# Patient Record
Sex: FEMALE | Race: WHITE | NOT HISPANIC OR LATINO | Employment: OTHER | URBAN - METROPOLITAN AREA
[De-identification: names, ages, dates, MRNs, and addresses within clinical notes are randomized per-mention and may not be internally consistent; named-entity substitution may affect disease eponyms.]

---

## 2017-03-28 ENCOUNTER — ALLSCRIPTS OFFICE VISIT (OUTPATIENT)
Dept: OTHER | Facility: OTHER | Age: 72
End: 2017-03-28

## 2017-04-11 ENCOUNTER — GENERIC CONVERSION - ENCOUNTER (OUTPATIENT)
Dept: OTHER | Facility: OTHER | Age: 72
End: 2017-04-11

## 2017-09-11 DIAGNOSIS — M85.80 OTHER SPECIFIED DISORDERS OF BONE DENSITY AND STRUCTURE, UNSPECIFIED SITE: ICD-10-CM

## 2017-09-11 DIAGNOSIS — C50.911 MALIGNANT NEOPLASM OF RIGHT FEMALE BREAST (HCC): ICD-10-CM

## 2017-09-29 ENCOUNTER — ALLSCRIPTS OFFICE VISIT (OUTPATIENT)
Dept: OTHER | Facility: OTHER | Age: 72
End: 2017-09-29

## 2017-10-02 NOTE — PROGRESS NOTES
Assessment  1  Carcinoma of right breast, estrogen receptor positive (174 9,V86 0) (C50 911,Z17 0)   2  Osteopenia (733 90) (M85 80)    Plan  Carcinoma of right breast, estrogen receptor positive, Osteopenia    · (1) CANCER ANTIGEN 15-3; Status:Active; Requested WRN:[id number]; Perform:[facility]; Due:03Mye8950;Ordered; For:Carcinoma of right breast, estrogen receptor positive, Osteopenia; Ordered By:Franchesca Packer;   · (1) CBC/PLT/DIFF; Status:Active; Requested UEC:[id number]; Perform:[facility]; Due:64Znq7066;Ordered; For:Carcinoma of right breast, estrogen receptor positive, Osteopenia; Ordered By:Franchesca Packer;   · (1) COMPREHENSIVE METABOLIC PANEL; Status:Active; Requested OKY:[id number]; Perform:[facility]; Due:56Gwl8795;Ordered; For:Carcinoma of right breast, estrogen receptor positive, Osteopenia; Ordered By:Franchesca Packer;   · MAMMO DIAGNOSTIC BILATERAL W 3D & CAD; Status:Active; Requested for:77Sxf2112; Perform:[facility]; Due:92Aup1036;Ordered; For:Carcinoma of right breast, estrogen receptor positive, Osteopenia; Ordered By:Franchesca Packer;   · Follow-up visit in 1 year Evaluation and Treatment  Follow-up  Status: Complete  Done:  60QPC6845 11:40AM   Ordered; For: Carcinoma of right breast, estrogen receptor positive, Osteopenia; Ordered By: Thelma Styles Performed:  Due: 53TLH9063; Last Updated By: Mellissa Roldan; 9/29/2017 12:55:39 PM    Discussion/Summary  Discussion Summary:   Joby Hanley remains in clinical remission of invasive ductal carcinoma the right breast, hormone receptor positive, T1cN0, diagnosed in March 2011  A little over 5 years of adjuvant anastrozole was completed in June 2016  Again, the patient is not in favor of extended adjuvant aromatase inhibitor therapy in view of osteopenia   She is scheduled for bilateral diagnostic mammogram in April 2018   600 mg with vitamin D daily is to be continued in regards to a borderline/mild osteopenia of lumbar spine and femoral neck  A follow-up DEXA scan is to be obtained within the next 1-2 years  patient and her  who was with her in the office today are aware to seek medical attention for excessive fatigue or if other new problems arise  Otherwise, I plan to see her again in 1 year  Chief Complaint  Chief Complaint Free Text Note Form: Jodi Price was seen in followup today in regards to invasive ductal carcinoma the right breast, hormone receptor positive, T1cN0, diagnosed in March 2011  History of Present Illness  HPI: She has been feeling well  She has been taking calcium 6 and milligrams of vitamin D daily  She is scheduled to follow-up with Dr Janie Willis in December 2017 including hand-held breast ultrasound  Previous Therapy:   1  Invasive ductal carcinoma of the right breast [place] grade III, [place] 70%, [place] 95%, HER-2/susanna negative by FISH (1 4), no metastasis to one sentinel lymph node, re-excision with negative posterior margin 2 mm, 1 05 cm, T1cN0 Oncotype Dx recurrence score 12 (low risk) and Arimidex from April 2011 until June 2016  Multiple colonic polyps on initial colonoscopy in October 2012, a few tiny polyps were removed in the fall of 2014, and a tubular adenoma was snared from the transverse colon in December 2016  Review of Systems  Complete-Female:   Constitutional: She has been feeling well  ENT: no hearing loss  Cardiovascular: no chest pain-and-no lower extremity edema  Respiratory: no shortness of breath-and-no cough  Gastrointestinal: no abdominal pain,-no constipation-and-no diarrhea  Musculoskeletal: There is mild chronic arthritis involving the hands, no back pain  Neurological: no headache-and-no difficulty walking  Psychiatric: no emotional problems  Active Problems  1  Carcinoma of right breast, estrogen receptor positive (174 9,V86 0) (C50 911,Z17 0)   2  History of colonic polyps (V12 72) (Z86 010)   3  Osteopenia (733 90) (M85 80)    Surgical History  1  History of Breast Surgery Lumpectomy   2  History of Hysterectomy    Family History  Mother    1  Family history of Breast cancer (174 9) (C50 919)  Aunt    2  Family history of Breast cancer (174 9) (C50 919)    Social History   · Never a smoker   · Remote social alcohol use    Current Meds   1  No Reported Medications Recorded    Allergies  1  Amoxicillin TABS    Vitals  Vital Signs    Recorded: 44HLE5778 12:17PM   Temperature 97 6 F   Heart Rate 104   Respiration 16   Systolic 837   Diastolic 70   Height 5 ft 3 in   Weight 166 lb    BMI Calculated 29 41   BSA Calculated 1 79   O2 Saturation 96   Pain Scale 0     Physical Exam    Constitutional She appears well  Eyes EOMI  Ears, Nose, Mouth, and Throat Oropharynx is clear appear  Pulmonary   Auscultation of lungs: Clear to auscultation  Cardiovascular Heart has regular rate and rhythm -No lower extremity edema  Abdomen   Abdomen: Non-tender, no masses  Liver and spleen: No hepatomegaly or splenomegaly  Lymphatic   Palpation of lymph nodes in neck: No lymphadenopathy  -No axillary or inguinal lymphadenopathy  Musculoskeletal   Gait and station: Normal     Neurologic Neurological is grossly intact  Psychiatric   Orientation to person, place, and time: Normal     Mood and affect: Normal     Additional Exam:  No breast masses bilaterally  ECOG 0       Results/Data  Results Form:   Results   Radiology Bilateral mammogram from January 19, 2016: There are scattered areas of fibroglandular density, no dominant masses or clusters of suspicious microcalcifications of the left breast, there are calcifications at the site of surgery of the right breast without clearly amorphic features, six-month precautionary mammographic follow-up is suggested to ensure stability  breast diagnostic mammogram from August 2, 2016: There is benign calcification with fat necrosis, no evidence of recurrent malignancy  diagnostic mammogram from April 11, 2017: Breast density is almost entirely fat, there are stable benign dystrophic calcifications at the right breast lumpectomy site, no evidence of breast recurrence or new breast lesion  Lab Results From March 21, 2017: CA 153 is 15  September 21, 2017: CA 15-3 is 17, WBC 8 8, hemoglobin 13 5, platelets 460, creatinine 0 75, alk-phos 67, bili 0 4, AST 9, ALT 22  Future Appointments    Date/Time Provider Specialty Site   10/05/2018 11:40 AM KIMBERLY Garcia   Hematology Oncology [facility]     Signatures   Electronically signed by : KIMBERLY Chávez ; Oct  1 2017  2:51PM EST                       (Author)

## 2018-01-13 NOTE — MISCELLANEOUS
Message   Recorded as Task   Date: 06/14/2016 01:29 PM, Created By: Rick Jimenez   Task Name: Follow Up   Assigned To: Anderson Patterson   Regarding Patient: Brenda Chapman, Status: Active   CommentEsefamilia Mcleod - 14 Jun 2016 1:29 PM     TASK CREATED  pt called to office [phone] regarding her arimidex  Dr Dixie Brar  stated she should call when she reaches the 5 year rodney  Anderson Patterson - 14 Jun 2016 2:13 PM     TASK EDITED  Pt was instructed to finish her arimidex prescription and then she is done with her hormonal therapy (pt has been taking arimidex for the past 5 years)  Pt was reminded of her f/u appointment with Dr Onofre Rushing on 9/30/16 at 1:30pm         Active Problems    1  Carcinoma of right breast, estrogen receptor positive (174 9,V86 0) (C50 911,Z17 0)   2  History of colonic polyps (V12 72) (Z86 010)   3  Osteopenia (733 90) (M85 80)    Current Meds   1  Anastrozole 1 MG Oral Tablet; Take 1 tablet daily; Therapy: 39Ssf0996 to (Evaluate:14Mar2016)  Requested for: 58Haf1349; Last   Rx:[id number] Ordered   2  Caltrate 600 Plus-Vit D 600-200 MG-UNIT TABS Recorded    Allergies    1  Amoxicillin TABS    Signatures   Electronically signed by :  Sarah Bell RN; Jun 14 2016  2:13PM EST                       (Author)

## 2018-01-14 VITALS
BODY MASS INDEX: 29.59 KG/M2 | RESPIRATION RATE: 16 BRPM | OXYGEN SATURATION: 98 % | HEART RATE: 74 BPM | TEMPERATURE: 97.6 F | DIASTOLIC BLOOD PRESSURE: 64 MMHG | SYSTOLIC BLOOD PRESSURE: 122 MMHG | HEIGHT: 63 IN | WEIGHT: 167 LBS

## 2018-01-15 VITALS
WEIGHT: 166 LBS | TEMPERATURE: 97.6 F | HEART RATE: 104 BPM | HEIGHT: 63 IN | DIASTOLIC BLOOD PRESSURE: 70 MMHG | RESPIRATION RATE: 16 BRPM | SYSTOLIC BLOOD PRESSURE: 128 MMHG | BODY MASS INDEX: 29.41 KG/M2 | OXYGEN SATURATION: 96 %

## 2018-01-18 NOTE — MISCELLANEOUS
Message  called patient per Dr Fuentes Manual of right breast on 8/2/16 showed no evidence of breast cancer  patient verbally understands  Active Problems    1  Carcinoma of right breast, estrogen receptor positive (174 9,V86 0) (C50 911,Z17 0)   2  History of colonic polyps (V12 72) (Z86 010)   3  Osteopenia (733 90) (M85 80)    Current Meds   1  Anastrozole 1 MG Oral Tablet; Take 1 tablet daily; Therapy: 02Sep2014 to (Evaluate:14Mar2016)  Requested for: 64Fud1017; Last   Rx:[id number] Ordered   2  Caltrate 600 Plus-Vit D 600-200 MG-UNIT TABS Recorded    Allergies    1   Amoxicillin TABS    Signatures   Electronically signed by : Latasha Jordan, ; Aug 22 2016  2:36PM EST                       (Author)

## 2018-04-11 DIAGNOSIS — C50.911 MALIGNANT NEOPLASM OF RIGHT FEMALE BREAST (HCC): ICD-10-CM

## 2018-04-11 DIAGNOSIS — M85.80 OTHER SPECIFIED DISORDERS OF BONE DENSITY AND STRUCTURE, UNSPECIFIED SITE: ICD-10-CM

## 2018-09-17 DIAGNOSIS — M85.80 OTHER SPECIFIED DISORDERS OF BONE DENSITY AND STRUCTURE, UNSPECIFIED SITE: ICD-10-CM

## 2018-09-17 DIAGNOSIS — C50.911 MALIGNANT NEOPLASM OF RIGHT FEMALE BREAST (HCC): ICD-10-CM

## 2018-10-02 ENCOUNTER — TELEPHONE (OUTPATIENT)
Dept: HEMATOLOGY ONCOLOGY | Facility: CLINIC | Age: 73
End: 2018-10-02

## 2018-10-02 DIAGNOSIS — C50.911 MALIGNANT NEOPLASM OF RIGHT FEMALE BREAST, UNSPECIFIED ESTROGEN RECEPTOR STATUS, UNSPECIFIED SITE OF BREAST (HCC): Primary | ICD-10-CM

## 2018-10-02 NOTE — TELEPHONE ENCOUNTER
Pt bloodwork script is  lab wouldn't draw her bw can we update it in [facility] she has appt this Friday?

## 2018-10-03 ENCOUNTER — TRANSCRIBE ORDERS (OUTPATIENT)
Dept: ADMINISTRATIVE | Facility: HOSPITAL | Age: 73
End: 2018-10-03

## 2018-10-03 ENCOUNTER — APPOINTMENT (OUTPATIENT)
Dept: LAB | Facility: MEDICAL CENTER | Age: 73
End: 2018-10-03
Payer: MEDICARE

## 2018-10-03 ENCOUNTER — APPOINTMENT (OUTPATIENT)
Dept: LAB | Age: 73
End: 2018-10-03
Payer: MEDICARE

## 2018-10-03 DIAGNOSIS — C50.911 MALIGNANT NEOPLASM OF RIGHT FEMALE BREAST, UNSPECIFIED ESTROGEN RECEPTOR STATUS, UNSPECIFIED SITE OF BREAST (HCC): ICD-10-CM

## 2018-10-03 DIAGNOSIS — C50.911 MALIGNANT NEOPLASM OF RIGHT FEMALE BREAST, UNSPECIFIED ESTROGEN RECEPTOR STATUS, UNSPECIFIED SITE OF BREAST (HCC): Primary | ICD-10-CM

## 2018-10-03 LAB
ALBUMIN SERPL BCP-MCNC: 3.7 G/DL (ref 3.5–5)
ALP SERPL-CCNC: 62 U/L (ref 46–116)
ALT SERPL W P-5'-P-CCNC: 23 U/L (ref 12–78)
ANION GAP SERPL CALCULATED.3IONS-SCNC: 4 MMOL/L (ref 4–13)
AST SERPL W P-5'-P-CCNC: 9 U/L (ref 5–45)
BASOPHILS # BLD AUTO: 0.08 THOUSANDS/ΜL (ref 0–0.1)
BASOPHILS NFR BLD AUTO: 1 % (ref 0–1)
BILIRUB SERPL-MCNC: 0.38 MG/DL (ref 0.2–1)
BUN SERPL-MCNC: 18 MG/DL (ref 5–25)
CALCIUM SERPL-MCNC: 9.9 MG/DL (ref 8.3–10.1)
CHLORIDE SERPL-SCNC: 106 MMOL/L (ref 100–108)
CO2 SERPL-SCNC: 29 MMOL/L (ref 21–32)
CREAT SERPL-MCNC: 0.82 MG/DL (ref 0.6–1.3)
EOSINOPHIL # BLD AUTO: 0.01 THOUSAND/ΜL (ref 0–0.61)
EOSINOPHIL NFR BLD AUTO: 0 % (ref 0–6)
ERYTHROCYTE [DISTWIDTH] IN BLOOD BY AUTOMATED COUNT: 13.8 % (ref 11.6–15.1)
GFR SERPL CREATININE-BSD FRML MDRD: 72 ML/MIN/1.73SQ M
GLUCOSE SERPL-MCNC: 86 MG/DL (ref 65–140)
HCT VFR BLD AUTO: 43.1 % (ref 34.8–46.1)
HGB BLD-MCNC: 13.7 G/DL (ref 11.5–15.4)
IMM GRANULOCYTES # BLD AUTO: 0.06 THOUSAND/UL (ref 0–0.2)
IMM GRANULOCYTES NFR BLD AUTO: 1 % (ref 0–2)
LYMPHOCYTES # BLD AUTO: 3.42 THOUSANDS/ΜL (ref 0.6–4.47)
LYMPHOCYTES NFR BLD AUTO: 29 % (ref 14–44)
MCH RBC QN AUTO: 29.1 PG (ref 26.8–34.3)
MCHC RBC AUTO-ENTMCNC: 31.8 G/DL (ref 31.4–37.4)
MCV RBC AUTO: 92 FL (ref 82–98)
MONOCYTES # BLD AUTO: 0.89 THOUSAND/ΜL (ref 0.17–1.22)
MONOCYTES NFR BLD AUTO: 8 % (ref 4–12)
NEUTROPHILS # BLD AUTO: 7.3 THOUSANDS/ΜL (ref 1.85–7.62)
NEUTS SEG NFR BLD AUTO: 61 % (ref 43–75)
NRBC BLD AUTO-RTO: 0 /100 WBCS
PLATELET # BLD AUTO: 292 THOUSANDS/UL (ref 149–390)
PMV BLD AUTO: 12.5 FL (ref 8.9–12.7)
POTASSIUM SERPL-SCNC: 4.7 MMOL/L (ref 3.5–5.3)
PROT SERPL-MCNC: 7.9 G/DL (ref 6.4–8.2)
RBC # BLD AUTO: 4.7 MILLION/UL (ref 3.81–5.12)
SODIUM SERPL-SCNC: 139 MMOL/L (ref 136–145)
WBC # BLD AUTO: 11.76 THOUSAND/UL (ref 4.31–10.16)

## 2018-10-03 PROCEDURE — 36415 COLL VENOUS BLD VENIPUNCTURE: CPT

## 2018-10-03 PROCEDURE — 86300 IMMUNOASSAY TUMOR CA 15-3: CPT

## 2018-10-03 PROCEDURE — 85025 COMPLETE CBC W/AUTO DIFF WBC: CPT

## 2018-10-03 PROCEDURE — 80053 COMPREHEN METABOLIC PANEL: CPT

## 2018-10-04 LAB — CANCER AG15-3 SERPL-ACNC: 7.2 U/ML (ref 0–25)

## 2018-10-05 ENCOUNTER — OFFICE VISIT (OUTPATIENT)
Dept: HEMATOLOGY ONCOLOGY | Facility: CLINIC | Age: 73
End: 2018-10-05
Payer: MEDICARE

## 2018-10-05 VITALS
HEART RATE: 66 BPM | WEIGHT: 168 LBS | RESPIRATION RATE: 16 BRPM | SYSTOLIC BLOOD PRESSURE: 122 MMHG | OXYGEN SATURATION: 96 % | DIASTOLIC BLOOD PRESSURE: 74 MMHG | TEMPERATURE: 97.6 F | BODY MASS INDEX: 29.77 KG/M2 | HEIGHT: 63 IN

## 2018-10-05 DIAGNOSIS — Z17.0 BREAST CANCER, STAGE 1, ESTROGEN RECEPTOR POSITIVE, RIGHT (HCC): Primary | ICD-10-CM

## 2018-10-05 DIAGNOSIS — M85.89 OSTEOPENIA OF MULTIPLE SITES: ICD-10-CM

## 2018-10-05 DIAGNOSIS — C50.911 BREAST CANCER, STAGE 1, ESTROGEN RECEPTOR POSITIVE, RIGHT (HCC): Primary | ICD-10-CM

## 2018-10-05 PROCEDURE — 99214 OFFICE O/P EST MOD 30 MIN: CPT | Performed by: INTERNAL MEDICINE

## 2018-10-05 NOTE — PATIENT INSTRUCTIONS
The patient and her  who was with her in the office today are aware to seek medical attention for excessive fatigue or if other new problems arise

## 2018-10-05 NOTE — PROGRESS NOTES
10/5/2018    Daniel Bhakta    She had called the arthritis of the left 1st metatarsal phalangeal joint 3 weeks ago treated with corticosteroid injection and ibuprofen 200 mg t i d  She has been feeling well otherwise  She takes calcium 600 mg with vitamin-D daily  Hematology/Oncology History:    1  Invasive ductal carcinoma of the right breast [place] grade III, ER 70%, [place] 95%, HER-2/susanna negative by FISH (1 4), no metastasis to one sentinel lymph node, re-excision with negative posterior margin 2 mm, 1 05 cm, T1cN0 Oncotype Dx recurrence score 12 (low risk) and Arimidex from April 2011 until June 2016     2  Multiple colonic polyps on initial colonoscopy in October 2012, a few tiny polyps were removed in the fall of 2014, and a tubular adenoma was snared from the transverse colon in December 2016  Review of systems:      Constitutional: She has been feeling well, she denies chills or sweats  HEENT: She denies nose bleeds  She denies oral cavity or throat soreness  Cardiovascular:  She denies chest pain, no edema  Respiratory:  She denies cough or dyspnea  GI:  Her appetite has been good  No abdominal pain  Bowel habits have been formed and regular  Musculoskeletal:  See HPI  She denies back pain or joint pain otherwise  Neurologic: She denies headache or paresthesias  She denies difficulty walking  Skin:  She denies rash  Psychiatric:  She denies emotional problems  Hematologic:  She denies easy bruising  Physical Examination:    Blood pressure 122/74, pulse 66, temperature 97 6 °F (36 4 °C), resp  rate 16, height 5' 3" (1 6 m), weight 76 2 kg (168 lb), SpO2 96 %  Body surface area is 1 8 meters squared  EOMI  No hearing deficit  Orophaynx clear  No lymphadenopathy of the neck  No axillary lymphadenopathy  (Breast examination was not done today )   Lungs are clear bilaterally  Heart has regular rate and rhythm  No hepatic or splenic enlargement   No inguinal lymphadenopathy  No lower extremity edema  No ecchymoses  Normal gait and station  Neurological is grossly intact  Oriented x3, normal mood and affect  ECOG 0-1    Laboratory:    From August 29, 2018:  Uric acid is 7 0    From October 3, 2018:  Creatinine 0 82, AST 9, ALT 23, alk-phos 62, bili 0 38, WBC 11 76, hemoglobin 13 7, platelets 162, CA 46-4 is 7 2    Bilateral 3D diagnostic mammogram from April 19, 2018:  Breast are mostly replaced with fat, no dominant mass, skin thickening or significant clustered microcalcifications  Assessment/Plan:    Theron Ram remains in clinical remission of invasive ductal carcinoma the right breast, hormone receptor positive, T1cN0, diagnosed in March 2011  A little over 5 years of adjuvant anastrozole was completed in June 2016  Again, the patient is not in favor of extended adjuvant aromatase inhibitor therapy in view of osteopenia  She is scheduled for bilateral diagnostic mammogram in April 2019     600 mg with vitamin D daily is to be continued in regards to a borderline/mild osteopenia of lumbar spine and femoral neck  A follow-up DEXA scan is to be obtained within the next year  The patient plans to follow up with her primary care physician in regards to the hyperuricemia and recent gouty arthritis as to potential preventive therapy such as allopurinol  The  patient and her  who was with her in the office today are aware to seek medical attention for recurrence of joint pain especially of the 1st MTP joints, excessive fatigue or if other new problems arise  Otherwise, I plan to see her again in 1 year

## 2018-12-12 ENCOUNTER — OFFICE VISIT (OUTPATIENT)
Dept: SURGERY | Facility: CLINIC | Age: 73
End: 2018-12-12
Payer: MEDICARE

## 2018-12-12 VITALS
WEIGHT: 169 LBS | HEART RATE: 64 BPM | DIASTOLIC BLOOD PRESSURE: 66 MMHG | BODY MASS INDEX: 29.95 KG/M2 | HEIGHT: 63 IN | RESPIRATION RATE: 16 BRPM | SYSTOLIC BLOOD PRESSURE: 140 MMHG

## 2018-12-12 DIAGNOSIS — Z12.39 BREAST CANCER SCREENING, HIGH RISK PATIENT: Primary | ICD-10-CM

## 2018-12-12 PROCEDURE — 99214 OFFICE O/P EST MOD 30 MIN: CPT | Performed by: SPECIALIST

## 2018-12-13 NOTE — PROGRESS NOTES
Wilda De Paz presents today for her yearly visit in regards to a history of right breast cancer  She underwent right partial mastectomy with sentinel node biopsy for invasive ductal carcinoma in March of 2011  Her sentinel node was negative  She underwent primary radiation therapy via mammo site  She has done extremely well since that time  Family off she denies any masses in either breast denies any nipple discharges  Most recent mammogram was negative  She does follow up with Dr Morales Current  saw him a few months ago  Physical exam:  Healthy-appearing elderly female awake alert no distress    Left breast:  No masses noted no nipple discharges  Left axilla is unremarkable  Right breast:  Transverse scar at about the 6 o'clock level below the nipple  No masses no nipple discharges  Right axilla shows a small scar with no evidence of lymphadenopathy  An ultrasound of both breasts are performed  The right breast demonstrates the biopsy site with no evidence of local recurrence  There are no evidence of masses either cystic or solid  Left breast is negative  Impression:  Continues to do well status post breast conserving therapy for a infiltrating ductal adenocarcinoma  Plan:  Continue current course  Notify the office if any problems should arise  Bring her back in 1 year

## 2019-05-08 DIAGNOSIS — Z12.39 BREAST CANCER SCREENING, HIGH RISK PATIENT: ICD-10-CM

## 2019-08-27 ENCOUNTER — TRANSCRIBE ORDERS (OUTPATIENT)
Dept: ADMINISTRATIVE | Facility: HOSPITAL | Age: 74
End: 2019-08-27

## 2019-10-01 ENCOUNTER — TELEPHONE (OUTPATIENT)
Dept: HEMATOLOGY ONCOLOGY | Facility: CLINIC | Age: 74
End: 2019-10-01

## 2019-10-01 NOTE — TELEPHONE ENCOUNTER
Patient called in as she received a message from Dr Oma Soto team in regards to time change for upcoming appointment , Transfer call to office for further assistance

## 2019-10-10 ENCOUNTER — OFFICE VISIT (OUTPATIENT)
Dept: HEMATOLOGY ONCOLOGY | Facility: CLINIC | Age: 74
End: 2019-10-10
Payer: MEDICARE

## 2019-10-10 VITALS
OXYGEN SATURATION: 98 % | DIASTOLIC BLOOD PRESSURE: 80 MMHG | WEIGHT: 168 LBS | TEMPERATURE: 97.7 F | RESPIRATION RATE: 18 BRPM | BODY MASS INDEX: 29.77 KG/M2 | HEIGHT: 63 IN | HEART RATE: 64 BPM | SYSTOLIC BLOOD PRESSURE: 136 MMHG

## 2019-10-10 DIAGNOSIS — M85.89 OSTEOPENIA OF MULTIPLE SITES: ICD-10-CM

## 2019-10-10 DIAGNOSIS — C50.911 BREAST CANCER, STAGE 1, ESTROGEN RECEPTOR POSITIVE, RIGHT (HCC): Primary | ICD-10-CM

## 2019-10-10 DIAGNOSIS — Z17.0 BREAST CANCER, STAGE 1, ESTROGEN RECEPTOR POSITIVE, RIGHT (HCC): Primary | ICD-10-CM

## 2019-10-10 PROCEDURE — 99214 OFFICE O/P EST MOD 30 MIN: CPT | Performed by: INTERNAL MEDICINE

## 2019-10-10 NOTE — PROGRESS NOTES
10/10/2019    Juan Diego Andino    She has been feeling well  She has been taking calcium 600 mg with vitamin-D daily  Hematology/Oncology History:    1  Invasive ductal carcinoma of the right breast [place] grade III, ER 70%, [place] 95%, HER-2/susanna negative by FISH (1 4), no metastasis to one sentinel lymph node, re-excision with negative posterior margin 2 mm, 1 05 cm, T1cN0 Oncotype Dx recurrence score 12 (low risk) and Arimidex from April 2011 until June 2016      2  Multiple colonic polyps on initial colonoscopy in October 2012, a few tiny polyps were removed in the fall of 2014, and a tubular adenoma was snared from the transverse colon in December 2016  Review of systems:      Constitutional: She has been feeling well, she denies chills or sweats  HEENT: She denies nose bleeds  She denies oral cavity or throat soreness  Cardiovascular:  She denies chest pain, no edema  Respiratory:  She denies cough or dyspnea  GI:  Her appetite has been good  No abdominal pain  Bowel habits have been formed and regular  Musculoskeletal:  She denies back pain or joint pain  Neurologic: She denies headache or paresthesias  She denies difficulty walking  Skin:  She denies rash  Psychiatric:  She denies emotional problems  Hematologic:  She denies easy bruising  Physical Examination:    Blood pressure 136/80, pulse 64, temperature 97 7 °F (36 5 °C), resp  rate 18, height 5' 3" (1 6 m), weight 76 2 kg (168 lb), SpO2 98 %  Body surface area is 1 8 meters squared  She appears well  EOMI  No hearing deficit  Orophaynx clear  No lymphadenopathy of the neck  No axillary lymphadenopathy  (Breast examination was not done today, she is scheduled to follow-up with her breast surgeon Dr Ana M Morton in December 2019 )   Lungs are clear bilaterally  Heart has regular rate and rhythm  No hepatic or splenic enlargement  No inguinal lymphadenopathy  No lower extremity edema  No ecchymoses  Normal gait and station  Neurological is grossly intact  Oriented x3, normal mood and affect      ECOG 0-1    Laboratory:     From October 3, 2019:  WBC is 8 8, hemoglobin 13 6, platelets 959, CA 95-2 is 15 (< 38), creatinine 0 8, calcium 9 1, alk-phos 63, bili 0 4, AST 13, ALT 20    3D bilateral screening mammogram from April 22, 2019: There are scattered areas of fibroglandular density, there is benign dystrophic calcification, 14 mm, at the right lumpectomy site with no adverse change to suggest local recurrence, benign right axillary nodes, no suspicious findings of the left breast or left axillary adenopathy  Assessment/Plan:    Jesse Bishop remains in clinical remission of invasive ductal carcinoma the right breast, hormone receptor positive, T1cN0, diagnosed in March 2011  She is scheduled for bilateral diagnostic mammogram in April 2020      600 mg with vitamin D daily is to be continued in regards to a borderline/mild osteopenia of lumbar spine and femoral neck  A follow-up DEXA scan is scheduled for October 14, 2019  The patient is to contact the office of her gastroenterologist Dr Rudolph Holliday as to whether she is due for surveillance colonoscopy this year      The  patient and her  who was with her in the office today are aware to seek medical attention for recurrence of joint pain, excessive fatigue or if other new problems arise  Otherwise, I plan to see her again in 1 year  Today's office visit required 25 minutes, over 50% of the time was utilized to review diagnostic tests, impressions and recommendations

## 2019-10-14 ENCOUNTER — TRANSCRIBE ORDERS (OUTPATIENT)
Dept: ADMINISTRATIVE | Facility: HOSPITAL | Age: 74
End: 2019-10-14

## 2019-10-14 ENCOUNTER — HOSPITAL ENCOUNTER (OUTPATIENT)
Dept: RADIOLOGY | Facility: IMAGING CENTER | Age: 74
Discharge: HOME | End: 2019-10-14
Payer: MEDICARE

## 2019-10-14 DIAGNOSIS — M85.89 OSTEOPENIA OF MULTIPLE SITES: ICD-10-CM

## 2019-10-14 PROCEDURE — 77080 DXA BONE DENSITY AXIAL: CPT

## 2019-10-22 ENCOUNTER — TELEPHONE (OUTPATIENT)
Dept: HEMATOLOGY ONCOLOGY | Facility: CLINIC | Age: 74
End: 2019-10-22

## 2019-10-22 NOTE — TELEPHONE ENCOUNTER
Called pt and left a message to call our office back  Per Dr Ashlyn Sosa pt's Dexa scan is normal bone density

## 2019-12-17 ENCOUNTER — OFFICE VISIT (OUTPATIENT)
Dept: SURGERY | Facility: CLINIC | Age: 74
End: 2019-12-17
Payer: MEDICARE

## 2019-12-17 VITALS
HEIGHT: 63 IN | HEART RATE: 80 BPM | SYSTOLIC BLOOD PRESSURE: 142 MMHG | BODY MASS INDEX: 29.77 KG/M2 | WEIGHT: 168 LBS | RESPIRATION RATE: 16 BRPM | DIASTOLIC BLOOD PRESSURE: 70 MMHG

## 2019-12-17 DIAGNOSIS — Z85.3 HISTORY OF BREAST CANCER: Primary | ICD-10-CM

## 2019-12-17 PROCEDURE — 99213 OFFICE O/P EST LOW 20 MIN: CPT | Performed by: SPECIALIST

## 2019-12-17 NOTE — PROGRESS NOTES
Hodan Adhikari and her  Heriberto Nieves presents today for Hodan Adhikari is yearly breast exam   She has a history of carcinoma of the right breast and underwent a partial mastectomy with sentinel node biopsy for invasive ductal carcinoma   This occurred March 2011  Her sentinel node was negative which was great  She underwent primary radiation therapy via mammo site insertion  She has done extremely well since then  Today in the office she denies any masses in either breast   She underwent mammogram in April of 2019 that demonstrated no evidence of malignancy or other abnormalities  She is otherwise healthy  Physical exam:  Healthy-appearing it elderly white female who is awake alert no distress    Left breast no masses or abnormalities palpated  No nipple discharges are present  The left axilla is unremarkable  Right breast:  There is a transverse incision just above the inframammary fold consistent with previous partial mastectomy  This area is soft with no evidence of local recurrence  The remainder the breast demonstrate no masses or nipple discharges  Right axilla is unremarkable  An ultrasound of the left breast performed  There is no sonographic evidence of mass, architectural distortion, etc etc   The biopsy site appears to be unremarkable  The right breast is also negative for any abnormalities  Impression:  Doing well 8 year status post breast conserving therapy on the left  Plan:  Stay the course  Notify the office if any problems or abnormalities should arise  We will make appointment to see her in 1 year

## 2020-05-22 DIAGNOSIS — C50.911 BREAST CANCER, STAGE 1, ESTROGEN RECEPTOR POSITIVE, RIGHT (HCC): ICD-10-CM

## 2020-05-22 DIAGNOSIS — Z17.0 BREAST CANCER, STAGE 1, ESTROGEN RECEPTOR POSITIVE, RIGHT (HCC): ICD-10-CM

## 2020-10-22 ENCOUNTER — NURSE TRIAGE (OUTPATIENT)
Dept: OTHER | Facility: OTHER | Age: 75
End: 2020-10-22

## 2020-10-22 ENCOUNTER — OFFICE VISIT (OUTPATIENT)
Dept: HEMATOLOGY ONCOLOGY | Facility: CLINIC | Age: 75
End: 2020-10-22
Payer: MEDICARE

## 2020-10-22 VITALS
HEIGHT: 64 IN | TEMPERATURE: 98.7 F | SYSTOLIC BLOOD PRESSURE: 126 MMHG | DIASTOLIC BLOOD PRESSURE: 68 MMHG | HEART RATE: 66 BPM | RESPIRATION RATE: 18 BRPM | BODY MASS INDEX: 28 KG/M2 | WEIGHT: 164 LBS | OXYGEN SATURATION: 96 %

## 2020-10-22 DIAGNOSIS — Z17.0 BREAST CANCER, STAGE 1, ESTROGEN RECEPTOR POSITIVE, RIGHT (HCC): Primary | ICD-10-CM

## 2020-10-22 DIAGNOSIS — U07.1 COVID-19 DETERMINED BY CLINICAL DIAGNOSTIC CRITERIA: ICD-10-CM

## 2020-10-22 DIAGNOSIS — C50.911 BREAST CANCER, STAGE 1, ESTROGEN RECEPTOR POSITIVE, RIGHT (HCC): Primary | ICD-10-CM

## 2020-10-22 DIAGNOSIS — U07.1 COVID-19 DETERMINED BY CLINICAL DIAGNOSTIC CRITERIA: Primary | ICD-10-CM

## 2020-10-22 PROCEDURE — U0003 INFECTIOUS AGENT DETECTION BY NUCLEIC ACID (DNA OR RNA); SEVERE ACUTE RESPIRATORY SYNDROME CORONAVIRUS 2 (SARS-COV-2) (CORONAVIRUS DISEASE [COVID-19]), AMPLIFIED PROBE TECHNIQUE, MAKING USE OF HIGH THROUGHPUT TECHNOLOGIES AS DESCRIBED BY CMS-2020-01-R: HCPCS | Performed by: FAMILY MEDICINE

## 2020-10-22 PROCEDURE — 99214 OFFICE O/P EST MOD 30 MIN: CPT | Performed by: INTERNAL MEDICINE

## 2020-10-24 LAB — SARS-COV-2 RNA SPEC QL NAA+PROBE: NOT DETECTED

## 2020-10-25 ENCOUNTER — TELEPHONE (OUTPATIENT)
Dept: OTHER | Facility: OTHER | Age: 75
End: 2020-10-25

## 2020-12-09 ENCOUNTER — OFFICE VISIT (OUTPATIENT)
Dept: SURGERY | Facility: CLINIC | Age: 75
End: 2020-12-09
Payer: MEDICARE

## 2020-12-09 VITALS
DIASTOLIC BLOOD PRESSURE: 70 MMHG | TEMPERATURE: 97.2 F | HEART RATE: 67 BPM | WEIGHT: 157 LBS | SYSTOLIC BLOOD PRESSURE: 124 MMHG | BODY MASS INDEX: 26.8 KG/M2 | HEIGHT: 64 IN

## 2020-12-09 DIAGNOSIS — Z12.31 SCREENING MAMMOGRAM FOR HIGH-RISK PATIENT: ICD-10-CM

## 2020-12-09 DIAGNOSIS — C50.919 BREAST CANCER (HCC): Primary | ICD-10-CM

## 2020-12-09 PROCEDURE — 99213 OFFICE O/P EST LOW 20 MIN: CPT | Performed by: SPECIALIST

## 2021-03-02 DIAGNOSIS — Z23 ENCOUNTER FOR IMMUNIZATION: ICD-10-CM

## 2021-12-15 ENCOUNTER — OFFICE VISIT (OUTPATIENT)
Dept: SURGERY | Facility: CLINIC | Age: 76
End: 2021-12-15
Payer: MEDICARE

## 2021-12-15 VITALS
WEIGHT: 163 LBS | HEART RATE: 66 BPM | TEMPERATURE: 97 F | HEIGHT: 63 IN | DIASTOLIC BLOOD PRESSURE: 68 MMHG | BODY MASS INDEX: 28.88 KG/M2 | SYSTOLIC BLOOD PRESSURE: 126 MMHG

## 2021-12-15 DIAGNOSIS — Z85.3 HISTORY OF BREAST CANCER: Primary | ICD-10-CM

## 2021-12-15 PROCEDURE — 99213 OFFICE O/P EST LOW 20 MIN: CPT | Performed by: SPECIALIST

## 2023-01-11 ENCOUNTER — OFFICE VISIT (OUTPATIENT)
Dept: SURGERY | Facility: CLINIC | Age: 78
End: 2023-01-11

## 2023-01-11 VITALS
DIASTOLIC BLOOD PRESSURE: 72 MMHG | WEIGHT: 155 LBS | HEART RATE: 67 BPM | OXYGEN SATURATION: 97 % | HEIGHT: 64 IN | SYSTOLIC BLOOD PRESSURE: 126 MMHG | BODY MASS INDEX: 26.46 KG/M2 | TEMPERATURE: 97.2 F

## 2023-01-11 DIAGNOSIS — Z17.0 BREAST CANCER, STAGE 1, ESTROGEN RECEPTOR POSITIVE, RIGHT: Primary | ICD-10-CM

## 2023-01-11 DIAGNOSIS — Z85.3 HISTORY OF BREAST CANCER: Primary | ICD-10-CM

## 2023-01-11 DIAGNOSIS — Z12.31 ENCOUNTER FOR SCREENING MAMMOGRAM FOR MALIGNANT NEOPLASM OF BREAST: ICD-10-CM

## 2023-01-11 DIAGNOSIS — C50.911 BREAST CANCER, STAGE 1, ESTROGEN RECEPTOR POSITIVE, RIGHT: Primary | ICD-10-CM

## 2023-01-11 NOTE — PROGRESS NOTES
True Barros presents today with her  Nasima Rojas for her yearly follow-up in regards to carcinoma of the right breast     In March 2011 she underwent right partial mastectomy with a negative sentinel node  Final pathology demonstrated invasive ductal carcinoma  She underwent primary radiation therapy via MammoSite insertion and she did well  We have seen her every year at the very least since then and she continues to do well  Most recent mammogram demonstrated no abnormalities present  She denies any masses in either breast     Physical exam: Elderly white female awake alert no distress  Right breast: Slightly smaller than the left  There is a scar at the 6:00 level of the breast just above the inframammary fold  No masses noted  No tenderness present  No nipple discharges  Right axilla is unremarkable  Left breast: Larger than the right  No masses noted  Soft  No nipple discharges  Left axilla is unremarkable  An ultrasound was performed of both breasts  Right breast first demonstrates the operative site with no evidence of local recurrence  No other abnormalities or architectural distortions are noted  Left breast similar  Atrophic tissue present  Impression: Continues to do well 12 years status post right partial mastectomy for node-negative adenocarcinoma of the breast     Plan: Keep on keeping on  Breast self examination, mammogram, etc  return in 1 year  Great people

## 2023-06-29 ENCOUNTER — OFFICE VISIT (OUTPATIENT)
Dept: URGENT CARE | Facility: MEDICAL CENTER | Age: 78
End: 2023-06-29
Payer: MEDICARE

## 2023-06-29 VITALS
BODY MASS INDEX: 28.35 KG/M2 | HEIGHT: 63 IN | TEMPERATURE: 98 F | WEIGHT: 160 LBS | DIASTOLIC BLOOD PRESSURE: 84 MMHG | SYSTOLIC BLOOD PRESSURE: 141 MMHG | HEART RATE: 72 BPM | OXYGEN SATURATION: 99 % | RESPIRATION RATE: 18 BRPM

## 2023-06-29 DIAGNOSIS — A69.20 ERYTHEMA MIGRANS (LYME DISEASE): Primary | ICD-10-CM

## 2023-06-29 PROCEDURE — G0463 HOSPITAL OUTPT CLINIC VISIT: HCPCS | Performed by: PHYSICIAN ASSISTANT

## 2023-06-29 PROCEDURE — 99213 OFFICE O/P EST LOW 20 MIN: CPT | Performed by: PHYSICIAN ASSISTANT

## 2023-06-29 RX ORDER — DOXYCYCLINE 100 MG/1
100 TABLET ORAL 2 TIMES DAILY
Qty: 28 TABLET | Refills: 0 | Status: SHIPPED | OUTPATIENT
Start: 2023-06-29 | End: 2023-07-13

## 2023-06-29 NOTE — PATIENT INSTRUCTIONS
1  Keep skin clean and  Dry  2  Take Doxycycline 100mg twice daily x 14 days  3  follow up with PCP in 3-5 days for re-check and possible serology  4  Proceed to  ER if symptoms worsen

## 2023-06-29 NOTE — PROGRESS NOTES
[facility]        NAME: Guillermo Perales is a 78 y o  female  : 1945    MRN: [id number]  DATE: 2023  TIME: 12:57 PM    Assessment and Plan   Erythema migrans (Lyme disease) [A69 20]  1  Erythema migrans (Lyme disease)  doxycycline (ADOXA) 100 MG tablet            Patient Instructions     1  Keep skin clean and  Dry  2  Take Doxycycline 100mg twice daily x 14 days  3  follow up with PCP in 3-5 days for re-check and possible serology  4  Proceed to  ER if symptoms worsen  Chief Complaint     Chief Complaint   Patient presents with   • Insect Bite     Pt reports tick bite on her left thigh with bullseye rash around it and itching x 3 days  History of Present Illness       Laura Dobson is a 79-year-old female who presents with redness and swelling of her left lateral thigh that was noticed 5 days prior  Patient reports she was bitten with an insect possibly a tick  She denies seeing a tick or removing one from her skin  Patient reports fever, chills and body aches prior to the onset of the rash  She reports her skin rash has worsened over the past 3 days  Insect Bite  Associated symptoms include arthralgias, fatigue, a fever and a rash  Review of Systems   Review of Systems   Constitutional: Positive for fatigue and fever  Musculoskeletal: Positive for arthralgias  Skin: Positive for rash           Current Medications       Current Outpatient Medications:   •  Calcium Carbonate (CALTRATE 600 PO), Take by mouth, Disp: , Rfl:   •  doxycycline (ADOXA) 100 MG tablet, Take 1 tablet (100 mg total) by mouth 2 (two) times a day for 14 days, Disp: 28 tablet, Rfl: 0    Current Allergies     Allergies as of 2023 - Reviewed 2023   Allergen Reaction Noted   • Amoxicillin Rash 2014            The following portions of the patient's history were reviewed and updated as appropriate: allergies, current medications, past family history, past medical history, past social "history, past surgical history and problem list      Past Medical History:   Diagnosis Date   • Breast cancer [facility])        Past Surgical History:   Procedure Laterality Date   • BREAST SURGERY         Family History   Problem Relation Age of Onset   • Cancer Mother    • Heart disease Father          Medications have been verified  Objective   /84   Pulse 72   Temp 98 °F (36 7 °C) (Temporal)   Resp 18   Ht 5' 3\" (1 6 m)   Wt 72 6 kg (160 lb)   SpO2 99%   BMI 28 34 kg/m²   No LMP recorded  Patient is postmenopausal        Physical Exam     Physical Exam  Constitutional:       General: She is not in acute distress  Appearance: Normal appearance  She is not ill-appearing  Cardiovascular:      Rate and Rhythm: Normal rate and regular rhythm  Heart sounds: Normal heart sounds, S1 normal and S2 normal  No murmur heard  Pulmonary:      Effort: Pulmonary effort is normal       Breath sounds: Normal breath sounds and air entry  Skin:     Comments: There is a 3 x 5 oval erythematous lesion on the lateral aspect of the left upper thigh  There is an area of central clearing  No visible foreign body or insect fragments visible on the affected skin region  Neurological:      Mental Status: She is alert                     "

## 2024-01-17 ENCOUNTER — OFFICE VISIT (OUTPATIENT)
Dept: SURGERY | Facility: CLINIC | Age: 79
End: 2024-01-17
Payer: MEDICARE

## 2024-01-17 VITALS
HEART RATE: 69 BPM | TEMPERATURE: 97.1 F | HEIGHT: 63 IN | BODY MASS INDEX: 28.35 KG/M2 | OXYGEN SATURATION: 98 % | SYSTOLIC BLOOD PRESSURE: 128 MMHG | DIASTOLIC BLOOD PRESSURE: 68 MMHG | WEIGHT: 160 LBS

## 2024-01-17 DIAGNOSIS — Z85.3 HISTORY OF BREAST CANCER: Primary | ICD-10-CM

## 2024-01-17 PROCEDURE — 99213 OFFICE O/P EST LOW 20 MIN: CPT | Performed by: SPECIALIST

## 2024-01-17 NOTE — PROGRESS NOTES
Sabrina is here today with her  Ravindra for her yearly follow-up in regards to carcinoma of the right breast.    In March 2011 she underwent a right partial mastectomy with a negative sentinel node.  Final pathology demonstrated invasive ductal carcinoma.  She underwent primary radiation therapy via MammoSite insertion and she did well.  We have seen her every year at the very least since then.    Most recent mammogram was unremarkable.  She denies any masses in either breast denies any nipple discharges.    Physical exam: Elderly white female awake alert no distress    Right breast: Slightly smaller than the left.  She has a scar at the 6:00 level of the breast just above the inframammary fold.  She has no palpable masses in this area or anywhere else in the breast itself.  No nipple discharges.  Axilla unremarkable.    Left breast: Soft somewhat atrophic.  No dominant mass or discharge.  Left axilla is unremarkable.    An ultrasound of both breasts were performed.  The operative site on the right side is inspected no abnormalities appear to be present at this area.  Left breast demonstrates no evidence of mass or architectural distortion.    Impression: Continues to do well 13 years status post right partial mastectomy with node-negative adenocarcinoma of the breast and primary radiation therapy with that MammoSite.    Plan: Stay the course.  Continue a breast self-examination, mammogram, yearly exam.  Notify problems.    These are great people and it is always good to see them.

## 2025-01-22 ENCOUNTER — OFFICE VISIT (OUTPATIENT)
Dept: SURGERY | Facility: CLINIC | Age: 80
End: 2025-01-22
Payer: MEDICARE

## 2025-01-22 VITALS
OXYGEN SATURATION: 97 % | HEIGHT: 62 IN | HEART RATE: 69 BPM | SYSTOLIC BLOOD PRESSURE: 128 MMHG | TEMPERATURE: 97.2 F | BODY MASS INDEX: 28.89 KG/M2 | WEIGHT: 157 LBS | DIASTOLIC BLOOD PRESSURE: 78 MMHG

## 2025-01-22 DIAGNOSIS — Z85.3 HISTORY OF BREAST CANCER: Primary | ICD-10-CM

## 2025-01-22 PROCEDURE — 99213 OFFICE O/P EST LOW 20 MIN: CPT | Performed by: SPECIALIST

## 2025-01-22 NOTE — PROGRESS NOTES
Sabrina presents today for her yearly follow-up visit in regards to carcinoma of the right breast.  Her  Ravindra is always with her    In March 2011 she underwent a right partial mastectomy with a negative sentinel node.  She underwent primary radiation therapy via MammoSite insertion and did well.  We have seen her every year since then and she continues to do well.    Most recent mammogram was unremarkable.    She denies any mass in either breast denies any nipple discharge.    Physical exam: Elderly white female who is awake alert no distress    Right breast: Demonstrates a scar at 6:00 with no evidence of local recurrence.  No mass nipple discharge or right axillary adenopathy present.    Left breast: No masses, discharge or tenderness present.  Left axilla is unremarkable.    Impression: Continues to do well status post right breast conserving therapy.    Plan: See in a year.  Continue the same i.e. breast self-examination yearly mammogram etc.    It is always great to see them.